# Patient Record
Sex: MALE | Race: WHITE | NOT HISPANIC OR LATINO | URBAN - METROPOLITAN AREA
[De-identification: names, ages, dates, MRNs, and addresses within clinical notes are randomized per-mention and may not be internally consistent; named-entity substitution may affect disease eponyms.]

---

## 2017-02-13 ENCOUNTER — COMMUNICATION - HEALTHEAST (OUTPATIENT)
Dept: INTERNAL MEDICINE | Facility: CLINIC | Age: 72
End: 2017-02-13

## 2017-02-16 ENCOUNTER — OFFICE VISIT - HEALTHEAST (OUTPATIENT)
Dept: INTERNAL MEDICINE | Facility: CLINIC | Age: 72
End: 2017-02-16

## 2017-02-16 DIAGNOSIS — R10.11 RIGHT UPPER QUADRANT ABDOMINAL PAIN: ICD-10-CM

## 2017-02-16 DIAGNOSIS — R19.7 DIARRHEA: ICD-10-CM

## 2017-02-16 ASSESSMENT — MIFFLIN-ST. JEOR: SCORE: 1536.39

## 2017-02-20 ENCOUNTER — COMMUNICATION - HEALTHEAST (OUTPATIENT)
Dept: INTERNAL MEDICINE | Facility: CLINIC | Age: 72
End: 2017-02-20

## 2017-02-21 ENCOUNTER — HOSPITAL ENCOUNTER (OUTPATIENT)
Dept: CT IMAGING | Facility: CLINIC | Age: 72
Discharge: HOME | End: 2017-02-21
Attending: INTERNAL MEDICINE

## 2017-02-21 DIAGNOSIS — R10.11 RIGHT UPPER QUADRANT ABDOMINAL PAIN: ICD-10-CM

## 2017-02-21 DIAGNOSIS — R19.7 DIARRHEA: ICD-10-CM

## 2017-03-01 ENCOUNTER — RECORDS - HEALTHEAST (OUTPATIENT)
Dept: ADMINISTRATIVE | Facility: OTHER | Age: 72
End: 2017-03-01

## 2017-04-14 ENCOUNTER — COMMUNICATION - HEALTHEAST (OUTPATIENT)
Dept: INTERNAL MEDICINE | Facility: CLINIC | Age: 72
End: 2017-04-14

## 2017-04-17 ENCOUNTER — RECORDS - HEALTHEAST (OUTPATIENT)
Dept: ADMINISTRATIVE | Facility: OTHER | Age: 72
End: 2017-04-17

## 2017-06-15 ENCOUNTER — OFFICE VISIT - HEALTHEAST (OUTPATIENT)
Dept: INTERNAL MEDICINE | Facility: CLINIC | Age: 72
End: 2017-06-15

## 2017-06-15 ENCOUNTER — RECORDS - HEALTHEAST (OUTPATIENT)
Dept: ADMINISTRATIVE | Facility: OTHER | Age: 72
End: 2017-06-15

## 2017-06-15 DIAGNOSIS — R35.0 URINARY FREQUENCY: ICD-10-CM

## 2017-06-15 DIAGNOSIS — K40.91 UNILATERAL RECURRENT INGUINAL HERNIA WITHOUT OBSTRUCTION OR GANGRENE: ICD-10-CM

## 2017-06-15 DIAGNOSIS — I10 ESSENTIAL HYPERTENSION WITH GOAL BLOOD PRESSURE LESS THAN 140/90: ICD-10-CM

## 2017-06-15 ASSESSMENT — MIFFLIN-ST. JEOR: SCORE: 1549.99

## 2017-06-19 ENCOUNTER — RECORDS - HEALTHEAST (OUTPATIENT)
Dept: ADMINISTRATIVE | Facility: OTHER | Age: 72
End: 2017-06-19

## 2017-06-27 ENCOUNTER — RECORDS - HEALTHEAST (OUTPATIENT)
Dept: ADMINISTRATIVE | Facility: OTHER | Age: 72
End: 2017-06-27

## 2017-07-05 ENCOUNTER — OFFICE VISIT - HEALTHEAST (OUTPATIENT)
Dept: INTERNAL MEDICINE | Facility: CLINIC | Age: 72
End: 2017-07-05

## 2017-07-05 DIAGNOSIS — Z01.818 PREOP EXAMINATION: ICD-10-CM

## 2017-07-05 ASSESSMENT — MIFFLIN-ST. JEOR
SCORE: 1549.99
SCORE: 1559.07

## 2017-07-06 LAB
ATRIAL RATE - MUSE: 63 BPM
DIASTOLIC BLOOD PRESSURE - MUSE: NORMAL MMHG
INTERPRETATION ECG - MUSE: NORMAL
P AXIS - MUSE: 59 DEGREES
PR INTERVAL - MUSE: 188 MS
QRS DURATION - MUSE: 84 MS
QT - MUSE: 452 MS
QTC - MUSE: 462 MS
R AXIS - MUSE: 13 DEGREES
SYSTOLIC BLOOD PRESSURE - MUSE: NORMAL MMHG
T AXIS - MUSE: 96 DEGREES
VENTRICULAR RATE- MUSE: 63 BPM

## 2017-07-07 ENCOUNTER — ANESTHESIA - HEALTHEAST (OUTPATIENT)
Dept: SURGERY | Facility: CLINIC | Age: 72
End: 2017-07-07

## 2017-07-10 ENCOUNTER — SURGERY - HEALTHEAST (OUTPATIENT)
Dept: SURGERY | Facility: CLINIC | Age: 72
End: 2017-07-10

## 2017-07-11 ASSESSMENT — MIFFLIN-ST. JEOR: SCORE: 1530.94

## 2018-01-02 ENCOUNTER — RECORDS - HEALTHEAST (OUTPATIENT)
Dept: ADMINISTRATIVE | Facility: OTHER | Age: 73
End: 2018-01-02

## 2018-01-25 ENCOUNTER — RECORDS - HEALTHEAST (OUTPATIENT)
Dept: ADMINISTRATIVE | Facility: OTHER | Age: 73
End: 2018-01-25

## 2018-01-26 ENCOUNTER — RECORDS - HEALTHEAST (OUTPATIENT)
Dept: ADMINISTRATIVE | Facility: OTHER | Age: 73
End: 2018-01-26

## 2018-01-29 ENCOUNTER — RECORDS - HEALTHEAST (OUTPATIENT)
Dept: ADMINISTRATIVE | Facility: OTHER | Age: 73
End: 2018-01-29

## 2019-03-24 ENCOUNTER — RECORDS - HEALTHEAST (OUTPATIENT)
Dept: ADMINISTRATIVE | Facility: OTHER | Age: 74
End: 2019-03-24

## 2020-11-11 ENCOUNTER — RECORDS - HEALTHEAST (OUTPATIENT)
Dept: ADMINISTRATIVE | Facility: OTHER | Age: 75
End: 2020-11-11

## 2020-11-13 ENCOUNTER — RECORDS - HEALTHEAST (OUTPATIENT)
Dept: ADMINISTRATIVE | Facility: OTHER | Age: 75
End: 2020-11-13

## 2021-05-28 ENCOUNTER — RECORDS - HEALTHEAST (OUTPATIENT)
Dept: ADMINISTRATIVE | Facility: CLINIC | Age: 76
End: 2021-05-28

## 2021-05-30 VITALS — HEIGHT: 73 IN | BODY MASS INDEX: 22.13 KG/M2 | WEIGHT: 167 LBS

## 2021-05-31 VITALS — HEIGHT: 73 IN | BODY MASS INDEX: 21.98 KG/M2 | WEIGHT: 165.8 LBS

## 2021-05-31 VITALS — HEIGHT: 73 IN | BODY MASS INDEX: 22.53 KG/M2 | WEIGHT: 170 LBS

## 2021-05-31 VITALS — WEIGHT: 172 LBS | HEIGHT: 73 IN | BODY MASS INDEX: 22.8 KG/M2

## 2021-06-11 NOTE — PROGRESS NOTES
[facility] Follow Up Note    Eduardo Lopez   71 y.o. male    Date of Visit: 6/15/2017    Chief Complaint   Patient presents with     Hernia     Back Pain     Urinary Frequency     leaking     Subjective  This is a 71-year-old man with known hypertension.  He comes in today, however, because of recurrence of a right inguinal hernia with some bulging and discomfort.  He also is having progressively worsening problems with urinary frequency, urgency and incontinence.  In addition to all of these things he has hearing difficulties [facility] and possible hearing aids.  The hernia recurrences come on within the past couple of weeks.  He was not doing anything unusual or any unusual activity.  The urinary symptoms have been getting gradually worse for a long time.  He has no dysuria and no hematuria.  In addition he continues to complain of some ongoing left back pain.  The gastroenterologist that this may be due to some slow movement in the bowel in that area.  No other changes are reported.    ROS A comprehensive review of systems was performed and was otherwise negative    Medications, allergies, and problem list were reviewed and updated    Exam  General Appearance:   On examination his blood pressure is 130/62.  Weight is 170 pounds and height is 73 inches.  BMI is 22.43.    Heart is in a sinus rhythm with a rate of 72 and no ectopy.    He does have a hernia on the right side that is not tender today.    There is some tenderness in the left lower back region lateral to the spine.    The patient is alert and oriented ×3.      Assessment/Plan  1. Essential hypertension with goal blood pressure less than 140/90     2. Unilateral recurrent inguinal hernia without obstruction or gangrene  Ambulatory referral to General Surgery   3. Urinary frequency  Ambulatory referral to Urology     Inguinal hernia.  We will refer him back to  the surgeons for reassessment of this.    Multiple urinary symptoms.  We will have him see the urologist for this.    Hypertension.  Stable at this time.  No change in medication.    I will follow-up with him after the consultations have been obtained.    Total time of this office visit was 25 minutes with greater than 50% of the time spent in care coordination and patient counseling.      Christiano Perez MD      Current Outpatient Prescriptions on File Prior to Visit   Medication Sig     acetaminophen (TYLENOL) 500 MG tablet Take 500 mg by mouth every 6 (six) hours as needed for pain.     cyanocobalamin (VITAMIN B-12) 500 MCG tablet Take 500 mcg by mouth daily.     donepezil (ARICEPT) 5 MG tablet Take 5 mg by mouth bedtime.     ferrous sulfate (IRON) 325 mg (65 mg iron) CpER Take by mouth.     folic acid (FOLVITE) 400 MCG tablet Take 400 mcg by mouth daily.     levETIRAcetam (KEPPRA) 500 MG tablet Take 500 mg by mouth 2 (two) times a day.     lisinopril (PRINIVIL,ZESTRIL) 10 MG tablet TAKE ONE TABLET BY MOUTH ONCE DAILY     [DISCONTINUED] lisinopril (PRINIVIL,ZESTRIL) 5 MG tablet Take 10 mg by mouth daily.      [DISCONTINUED] omeprazole (PRILOSEC) 40 MG capsule Take 40 mg by mouth daily.     No current facility-administered medications on file prior to visit.      No Known Allergies  Social History   Substance Use Topics     Smoking status: Current Every Day Smoker     Smokeless tobacco: Never Used     Alcohol use None

## 2021-06-11 NOTE — ANESTHESIA POSTPROCEDURE EVALUATION
Patient: Eduardo Lopez  CYSTOSCOPY, TRANSURETHRAL RESECTION OF PROSTATE  Anesthesia type: spinal    Patient location: PACU  Last vitals:   Vitals:    07/10/17 0900   BP: 123/73   Pulse: (!) 51   Resp: 18   Temp:    SpO2: 99%     Post vital signs: stable  Level of consciousness: awake, alert and responds to simple questions  Post-anesthesia pain: pain controlled  Post-anesthesia nausea and vomiting: no  Pulmonary: unassisted, return to baseline  Cardiovascular: stable and blood pressure at baseline  Hydration: adequate  Anesthetic events: no, SAB resolving    QCDR Measures:  ASA# 11 - Peggy-op Cardiac Arrest: ASA11B - Patient did NOT experience unanticipated cardiac arrest  ASA# 12 - Peggy-op Mortality Rate: ASA12B - Patient did NOT die  ASA# 13 - PACU Re-Intubation Rate: NA - No ETT / LMA used for case  ASA# 10 - Composite Anes Safety: ASA10A - No serious adverse event  ASA# 38 - New Corneal Injury: ASA38A - No new exposure keratitis or corneal abrasion in PACU    Additional Notes:

## 2021-06-11 NOTE — ANESTHESIA PREPROCEDURE EVALUATION
Anesthesia Evaluation      Patient summary reviewed   No history of anesthetic complications     Airway   Mallampati: II  Neck ROM: limited   Pulmonary - normal exam   (+) a smoker                         Cardiovascular - normal exam  (+) hypertension well controlled, ,     ECG reviewed        Neuro/Psych    (+) dementia,     Endo/Other - negative ROS      GI/Hepatic/Renal - negative ROS           Dental    (+) edentulous, lower dentures and upper dentures                       Anesthesia Plan  Planned anesthetic: spinal    ASA 3     Anesthetic plan and risks discussed with: patient and spouse  Anesthesia plan special considerations: antiemetics,   Post-op plan: routine recovery

## 2021-06-11 NOTE — PROGRESS NOTES
[facility] Follow Up Note    Eduardo Lopez   71 y.o. male    Date of Visit: 7/5/2017    Chief Complaint   Patient presents with     Pre-op Exam     7/10, Dr Spenser Quintanilla, prostate     Subjective  This is a 71-year-old man who is in today for preoperative evaluation.  I saw him several weeks ago at which time he was complaining of marked increase in urinary symptoms including frequency, hesitancy, urgency and worsening nocturia.  He was referred to urology and he is now being scheduled for prostate surgery which is to be done at [facility] on July 10.  He tells me that he has no other specific issues today.  He does have a known recurrent right inguinal hernia as well as a ventral hernia and is planning to meet with the surgeons about these later this summer.  At this point he felt the urinary symptoms were of greater concern and more of a problem for him.  At this point he has no other major concerns.  Medications are as listed.    Past surgical history: This is included cataract surgery, hernia repair and cholecystectomy.    Past medical history: He has had chronic anemia due to B12 deficiency, previous hernias, hypertension, mild dementia, and hearing loss.  He had a history of seizures going back about 2 years but this is been very stable.    Social history: The patient is .  His wife is a retired nurse.  He smokes about 1 pack of cigarettes daily.  He did have a history in the distant past of excessive alcohol use but this is not been a recent problem.    Family history: This is noncontributory to the current issue.    ROS A comprehensive review of systems was performed and was otherwise negative    Medications, allergies, and problem list were reviewed and updated    Exam  General Appearance:   On examination today his blood pressure is 122/60.  Weight is 172 pounds and height is 73 inches.  BMI is 22.69.    Eyes: Pupils are equal and conjunctiva are  normal.    Ears nose throat: Normal.    Neck: Supple with no masses and no neck vein distention.  No thyroid enlargement.    Lungs: Clear.    Cardiovascular: Heart is in a sinus rhythm with a rate of 70 and no ectopy.  No gallops or murmurs.  Carotid pulses are full.  No bruits heard.  No peripheral edema.    GI: Abdomen is soft and nontender with no distention.  He does have a right inguinal hernia.  He also has a small ventral hernia.  No obvious masses or organomegaly.    Musculoskeletal: Head and neck are normal to inspection with good range of motion.  Reasonable strength and motion in all 4 extremities.    Neurologic: Cranial nerves appear to be intact.  Gait is normal.    Psychiatric: The patient is alert and oriented ×3.  Mood and affect are stable.      Assessment/Plan  1. Preop examination  Electrocardiogram Perform and Read    Hemoglobin    Potassium     I do not see any contraindication to the proposed surgical procedure.  We did do an EKG today and will also check his potassium and hemoglobin.  All of this information will be available at [facility] next week.  As I recall he has not had any significant bleeding or anesthesia issues with previous surgery.  I have no additional recommendations to make at this time.  We will follow-up with him after the prostate surgery to make sure he is seen by the surgeon for evaluation of the hernias.    Total time of this preoperative visit was 40 minutes with greater than 50% of the time spent in care coordination and patient counseling.      Christiano Perez MD      Current Outpatient Prescriptions on File Prior to Visit   Medication Sig     acetaminophen (TYLENOL) 500 MG tablet Take 500 mg by mouth every 6 (six) hours as needed for pain.     CHOLECALCIFEROL, VITAMIN D3, (VITAMIN D3 ORAL) Take 50,000 mg by mouth once a week.     cyanocobalamin (VITAMIN B-12) 500 MCG tablet Take 1,000 mcg by mouth daily.      donepezil (ARICEPT) 5 MG tablet Take 5 mg by mouth  bedtime.     ferrous sulfate (IRON) 325 mg (65 mg iron) CpER Take by mouth.     folic acid (FOLVITE) 400 MCG tablet Take 400 mcg by mouth daily.     levETIRAcetam (KEPPRA) 500 MG tablet Take 500 mg by mouth 2 (two) times a day.     lisinopril (PRINIVIL,ZESTRIL) 10 MG tablet TAKE ONE TABLET BY MOUTH ONCE DAILY     No current facility-administered medications on file prior to visit.      Allergies   Allergen Reactions     Nsaids (Non-Steroidal Anti-Inflammatory Drug) Other (See Comments)     sttomach ulcers     Social History   Substance Use Topics     Smoking status: Current Every Day Smoker     Packs/day: 2.00     Smokeless tobacco: Never Used     Alcohol use Yes      Comment: rare

## 2021-06-11 NOTE — ANESTHESIA CARE TRANSFER NOTE
Last vitals:   Vitals:    07/10/17 0805   BP:    Pulse:    Resp:    Temp: (P) 36.3  C (97.4  F)   SpO2:      Patient's level of consciousness is drowsy  Spontaneous respirations: yes  Maintains airway independently: yes  Dentition unchanged: yes  Oropharynx: oropharynx clear of all foreign objects   Patient alert and orientated, SAB intact    QCDR Measures:  ASA# 20 - Surgical Safety Checklist: ASA20A - Safety Checks Done  PQRS# 430 - Adult PONV Prevention: 4558F - Pt received => 2 anti-emetic agents (different classes) preop & intraop  ASA# 8 - Peds PONV Prevention: NA - Not pediatric patient, not GA or 2 or more risk factors NOT present  PQRS# 424 - Peggy-op Temp Management: 4559F - At least one body temp DOCUMENTED => 35.5C or 95.9F within required timeframe  PQRS# 426 - PACU Transfer Protocol: - Transfer of care checklist used  ASA# 14 - Acute Post-op Pain: ASA14B - Patient did NOT experience pain >= 7 out of 10   To PACU, VSS, ON 10L FM O2, Report to RN

## 2021-06-11 NOTE — ANESTHESIA PROCEDURE NOTES
Spinal Block    Patient location during procedure: OR  Start time: 7/10/2017 7:16 AM  End time: 7/10/2017 7:20 AM  Reason for block: primary anesthetic    Staffing:  Performing  Anesthesiologist: AZALIA COLLIER    Preanesthetic Checklist  Completed: patient identified, risks, benefits, and alternatives discussed, timeout performed, consent obtained, airway assessed, oxygen available, suction available, emergency drugs available and hand hygiene performed  Spinal Block  Patient position: sitting  Prep: ChloraPrep and site prepped and draped  Patient monitoring: heart rate, cardiac monitor, continuous pulse ox and blood pressure  Approach: left paramedian  Location: L3-4  Injection technique: single-shot  Needle type: pencil-tip   Needle gauge: 24 G    Assessment  Sensory level: T6

## 2021-06-16 PROBLEM — I63.513: Status: ACTIVE | Noted: 2019-11-03

## 2021-06-16 PROBLEM — G30.9 ALZHEIMER'S DEMENTIA WITHOUT BEHAVIORAL DISTURBANCE (H): Status: ACTIVE | Noted: 2019-11-02

## 2021-06-16 PROBLEM — R56.9 SEIZURE (H): Status: ACTIVE | Noted: 2019-11-02

## 2021-06-16 PROBLEM — R79.89 ELEVATED TROPONIN: Status: ACTIVE | Noted: 2019-11-02

## 2021-06-16 PROBLEM — I35.0 NONRHEUMATIC AORTIC VALVE STENOSIS: Status: ACTIVE | Noted: 2019-11-04

## 2021-06-16 PROBLEM — F02.80 ALZHEIMER'S DEMENTIA WITHOUT BEHAVIORAL DISTURBANCE (H): Status: ACTIVE | Noted: 2019-11-02

## 2022-09-26 ENCOUNTER — TRANSCRIBE ORDERS (OUTPATIENT)
Dept: OTHER | Age: 77
End: 2022-09-26

## 2022-09-26 DIAGNOSIS — D49.2 NEOPLASM OF SKIN OF SCALP: Primary | ICD-10-CM

## 2022-10-03 NOTE — PROGRESS NOTES
Department of Radiation Oncology  Radiation Therapy Center  [facility]  [address], [address]  [place] [address]  [phone]       Consultation Note    Name: Eduardo Lopez MRN: [id number]   : 1945   Date of Service: 10/4/2022  Referring: Dr. Curry / Dr. 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     He initially noticed a small left crown lesion in 2022. The lesion was progressive and became painful. He was subsequently referred to Dr. Curry, whom he saw on 22. On exam, a pink violaceous dermal-like nodule of the left crown was noted. Two similar appearing nodules on the left posterior deltoid were also seen. A biopsy was taken of the scalp, which demonstrated atypical spindle cell neoplasm, differential including sarcomatoid squamous cell carcinoma and angiosarcoma.     He had follow-up with Dr. Curry on 22. At that time, he was found to have a crusted hemorrhagic biopsy site of the crown and 2 dermal nodules of the left posterior deltoid. There was another lesion of the right lower abdomen. All sites were biopsied. Pathology is pending.    CT CAP 22 did not demonstrate metastatic disease. CT head the same day showed the scalp lesion with slight erosion of the outer table of skull but otherwise no other lesions.     MR head has been ordered and is scheduled for next week.     He is seeing Dr. Curry later this week for stiches removal.     Past Medical History:   Past Medical History:   Diagnosis Date     Arthritis      Cancer (H)      Cerebral infarction (H)      COPD (chronic obstructive pulmonary disease) (H)      Hypertension        Past Surgical History:   Past Surgical History:   Procedure Laterality Date     APPENDECTOMY        CHOLECYSTECTOMY       FOREIGN BODY REMOVAL      bullets from abdomen     HC TRANSURETHRAL ELEC-SURG PROSTATECTOM N/A 7/10/2017    Procedure: CYSTOSCOPY, TRANSURETHRAL RESECTION OF PROSTATE;  Surgeon: Umberto Dueñas MD;  Location: [facility];  Service: Urology     HERNIA REPAIR         Chemotherapy History:  No    Radiation History:  No    Pregnant: Not Applicable  Implanted Cardiac Devices: No    Medications:  Current Outpatient Medications   Medication     cyanocobalamin (VITAMIN B-12) 100 MCG tablet     donepezil (ARICEPT) 10 MG tablet     folic acid (FOLVITE) 1 MG tablet     levETIRAcetam (KEPPRA) 500 MG tablet     lisinopril (PRINIVIL,ZESTRIL) 10 MG tablet     simvastatin (ZOCOR) 40 MG tablet     Vitamin D, Cholecalciferol, 25 MCG (1000 UT) CAPS     acetaminophen (TYLENOL) 500 MG tablet     loratadine (CLARITIN) 10 MG tablet     No current facility-administered medications for this visit.       Allergies:     Allergies   Allergen Reactions     Gluten Meal Unknown and GI Disturbance     Celiac's disease  Celiac's disease  Celiacs       Nsaids (Non-Steroidal Anti-Inflammatory Drug) [Nsaids] Other (See Comments)     stomach ulcers       Social History:  Tobacco: Current smoker, since age 3    Family History:  History reviewed. No pertinent family history.    Review of Systems   A 10-point review of systems was performed. Pertinent findings are noted in the HPI.    Physical Exam   ECOG Status: 0    Vitals:  /76 (BP Location: Right arm, Cuff Size: Adult Regular)   Pulse 61   Resp 16   Wt 84.3 kg (185 lb 12.8 oz)   SpO2 96%   BMI 24.51 kg/m      Gen: Appears healthy, in NAD  Head: NC/AT  Eyes: PERRL, EOMI, sclera anicteric  Ears: No external auricular lesions, decreased hearing bilaterally  Nose/sinus: No rhinorrhea or epistaxis  Neck: Full ROM, supple  Pulm: No wheezing, stridor or respiratory distress  CV: Extremities are warm and well-perfused, no cyanosis, no pedal edema  Abdominal: Soft,  nontender  Musculoskeletal: Normal bulk and tone  Skin: Normal color and turgor; recent biopsy sites of the scalp and L shoulder are c/d/i  Neuro: A/Ox3, CN II-XII intact, normal gait    Imaging/Path/Labs   Imaging: Reviewed    Path:   9/2/22   SKIN, SCALP, BIOPSY:   1. Atypical spindle cell neoplasm   2. Recommend complete resection for definitive classification and prognostication      Histologic sections show an atypical spindle cell neoplasm infiltrating the dermis. The tumor cells show cytokeratin and ERG expression. The differential diagnosis includes sarcomatoid squamous cell carcinoma, angiosarcoma, and atypical fibroxanthoma, the latter provided the lesion is confined to the dermis and does not extend into the subcutaneous tissue.     Labs: Reviewed    Assessment    Mr. 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     Plan     1) PET/CT and MRI head for staging.     2) Will follow-up on biopsy pathology from 9/23/22, which will help clarify diagnosis and staging of disease.     3) Follow-up in clinic after staging imaging, biopsy results, and meeting with Dr. Corbin.    Patient was seen and discussed with Dr. Gonzales.     Jessica Polk MD PGY4  Department of Radiation Oncology    I was present with the resident during the visit. I discussed the case with the resident and agree with the note as documented by the resident.    Vasile Gonzales M.D.  Department of Radiation Oncology  [facility]

## 2022-10-04 ENCOUNTER — OFFICE VISIT (OUTPATIENT)
Dept: RADIATION THERAPY | Facility: OUTPATIENT CENTER | Age: 77
End: 2022-10-04
Payer: COMMERCIAL

## 2022-10-04 VITALS
OXYGEN SATURATION: 96 % | BODY MASS INDEX: 24.51 KG/M2 | SYSTOLIC BLOOD PRESSURE: 118 MMHG | RESPIRATION RATE: 16 BRPM | WEIGHT: 185.8 LBS | DIASTOLIC BLOOD PRESSURE: 76 MMHG | HEART RATE: 61 BPM

## 2022-10-04 DIAGNOSIS — C49.9 ANGIOSARCOMA (H): ICD-10-CM

## 2022-10-04 RX ORDER — UBIDECARENONE 75 MG
100 CAPSULE ORAL DAILY
COMMUNITY

## 2022-10-04 RX ORDER — FAMOTIDINE 20 MG
1000 TABLET ORAL 2 TIMES DAILY
COMMUNITY

## 2022-10-04 RX ORDER — LORATADINE 10 MG/1
10 TABLET ORAL DAILY
COMMUNITY

## 2022-10-04 NOTE — LETTER
10/4/2022         RE: Eduardo Lopez  [address] [address] [address]  [facility] [address]        Dear Colleague,    Thank you for referring your patient, Eduardo Lopez, to the [facility]. Please see a copy of my visit note below.       Department of Radiation Oncology  Radiation Therapy Center  [facility]  [address], [address]  [place] [address]  [phone]       Consultation Note    Name: Eduardo Lopez MRN: [id number]   : 1945   Date of Service: 10/4/2022  Referring: Dr. Curry / Dr. 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     He initially noticed a small left crown lesion in 2022. The lesion was progressive and became painful. He was subsequently referred to Dr. Curry, whom he saw on 22. On exam, a pink violaceous dermal-like nodule of the left crown was noted. Two similar appearing nodules on the left posterior deltoid were also seen. A biopsy was taken of the scalp, which demonstrated atypical spindle cell neoplasm, differential including sarcomatoid squamous cell carcinoma and angiosarcoma.     He had follow-up with Dr. Curry on 22. At that time, he was found to have a crusted hemorrhagic biopsy site of the crown and 2 dermal nodules of the left posterior deltoid. There was another lesion of the right lower abdomen. All sites were biopsied. Pathology is pending.    CT CAP 22 did not demonstrate metastatic disease. CT head the same day showed the scalp lesion with slight erosion of the outer table of skull but otherwise no other lesions.      head has been ordered and is scheduled for next week.     He is seeing Dr. Curry later this week for stiches removal.     Past Medical History:   Past Medical  History:   Diagnosis Date     Arthritis      Cancer (H)      Cerebral infarction (H)      COPD (chronic obstructive pulmonary disease) (H)      Hypertension        Past Surgical History:   Past Surgical History:   Procedure Laterality Date     APPENDECTOMY       CHOLECYSTECTOMY       FOREIGN BODY REMOVAL      bullets from abdomen     HC TRANSURETHRAL ELEC-SURG PROSTATECTOM N/A 7/10/2017    Procedure: CYSTOSCOPY, TRANSURETHRAL RESECTION OF PROSTATE;  Surgeon: Umberto Dueñas MD;  Location: [facility];  Service: Urology     HERNIA REPAIR         Chemotherapy History:  No    Radiation History:  No    Pregnant: Not Applicable  Implanted Cardiac Devices: No    Medications:  Current Outpatient Medications   Medication     cyanocobalamin (VITAMIN B-12) 100 MCG tablet     donepezil (ARICEPT) 10 MG tablet     folic acid (FOLVITE) 1 MG tablet     levETIRAcetam (KEPPRA) 500 MG tablet     lisinopril (PRINIVIL,ZESTRIL) 10 MG tablet     simvastatin (ZOCOR) 40 MG tablet     Vitamin D, Cholecalciferol, 25 MCG (1000 UT) CAPS     acetaminophen (TYLENOL) 500 MG tablet     loratadine (CLARITIN) 10 MG tablet     No current facility-administered medications for this visit.       Allergies:     Allergies   Allergen Reactions     Gluten Meal Unknown and GI Disturbance     Celiac's disease  Celiac's disease  Celiacs       Nsaids (Non-Steroidal Anti-Inflammatory Drug) [Nsaids] Other (See Comments)     stomach ulcers       Social History:  Tobacco: Current smoker, since age 3    Family History:  History reviewed. No pertinent family history.    Review of Systems   A 10-point review of systems was performed. Pertinent findings are noted in the HPI.    Physical Exam   ECOG Status: 0    Vitals:  /76 (BP Location: Right arm, Cuff Size: Adult Regular)   Pulse 61   Resp 16   Wt 84.3 kg (185 lb 12.8 oz)   SpO2 96%   BMI 24.51 kg/m      Gen: Appears healthy, in NAD  Head: NC/AT  Eyes: PERRL, EOMI, sclera anicteric  Ears: No  external auricular lesions, decreased hearing bilaterally  Nose/sinus: No rhinorrhea or epistaxis  Neck: Full ROM, supple  Pulm: No wheezing, stridor or respiratory distress  CV: Extremities are warm and well-perfused, no cyanosis, no pedal edema  Abdominal: Soft, nontender  Musculoskeletal: Normal bulk and tone  Skin: Normal color and turgor; recent biopsy sites of the scalp and L shoulder are c/d/i  Neuro: A/Ox3, CN II-XII intact, normal gait    Imaging/Path/Labs   Imaging: Reviewed    Path:   9/2/22   SKIN, SCALP, BIOPSY:   1. Atypical spindle cell neoplasm   2. Recommend complete resection for definitive classification and prognostication      Histologic sections show an atypical spindle cell neoplasm infiltrating the dermis. The tumor cells show cytokeratin and ERG expression. The differential diagnosis includes sarcomatoid squamous cell carcinoma, angiosarcoma, and atypical fibroxanthoma, the latter provided the lesion is confined to the dermis and does not extend into the subcutaneous tissue.     Labs: Reviewed    Assessment    Mr. 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     Plan     1) PET/CT and MRI head for staging.     2) Will follow-up on biopsy pathology from 9/23/22, which will help clarify diagnosis and staging of disease.     3) Follow-up in clinic after staging imaging, biopsy results, and meeting with Dr. Corbin.    Patient was seen and discussed with Dr. Gonzales.     Jessica Polk MD PGY4  Department of Radiation Oncology    I was present with the resident during the visit. I discussed the case with the resident and agree with the note as documented by the resident.    Vasile Gonzales M.D.  Department of Radiation Oncology  [facility]

## 2022-10-04 NOTE — NURSING NOTE
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 Kaminski    PERSONAL HISTORY OF CANCER   Previous Cancer ? no   Prior Radiation ? no   Prior Chemotherapy ? no   Prior Hormonal Therapy ? no     REFERRALS NEEDED  Not at this time    VITALS  /76 (BP Location: Right arm, Cuff Size: Adult Regular)   Pulse 61   Resp 16   Wt 84.3 kg (185 lb 12.8 oz)   SpO2 96%   BMI 24.51 kg/m      PACEMAKER/IMPLANTED CARDIAC DEVICE : No    PAIN  Left shoulder, s/p resection with [facility]    PSYCHOSOCIAL  Marital Status:   Patient lives in [place], [place] with his wife Yoko.  Number of children: 2  Working status: retired  Do you feel safe in your home? Yes    REVIEW OF SYSTEMS  Skin: incisions healing - top of head, left shoulder, right flank  Eyes: glasses  Ears/Nose/Throat: very [place], does not have hearing aids, wife helps with translation  Respiratory: \"smokers cough\" cough and sob, has been smoking since age 3   Cardiovascular: negative  Gastrointestinal: celiac disease, some bouts of diarrhea, managed mainly by diet  Genitourinary: had cystoscopy, issues with leaking, wears a pad  Musculoskeletal: arthritis and joint pain  Neurologic: dementia/alzheimer's, uses a cane, gait falls to the left when walking, no falls  Psychiatric: negative  Hematologic/Lymphatic/Immunologic: negative  Endocrine: negative    Radiation Oncology Patient Teaching    Current Concern: need further staging, and pathology is pending    Person involved with teaching: Patient and Wife  Patient asked Questions: Yes  Patient was cooperative: Yes  Patient was receptive (willing to accept information given): Yes    Education Assessment  Comprehension ability: Medium  Knowledge level: Medium  Factors affecting teaching:  Mental status/memory    Response To Teaching  Verbalizes understanding    Do you have an advanced directive or living will? yes  Are you DNR/DNI? no        "

## 2022-10-20 ENCOUNTER — TRANSFERRED RECORDS (OUTPATIENT)
Dept: HEALTH INFORMATION MANAGEMENT | Facility: CLINIC | Age: 77
End: 2022-10-20

## 2022-11-03 ENCOUNTER — TELEPHONE (OUTPATIENT)
Dept: RADIATION THERAPY | Facility: OUTPATIENT CENTER | Age: 77
End: 2022-11-03

## 2022-11-03 NOTE — TELEPHONE ENCOUNTER
Patient does not need radiation therapy at this time. He will continue to follow with Dr Curry and Dr Kaminski.